# Patient Record
Sex: FEMALE | URBAN - METROPOLITAN AREA
[De-identification: names, ages, dates, MRNs, and addresses within clinical notes are randomized per-mention and may not be internally consistent; named-entity substitution may affect disease eponyms.]

---

## 2021-08-03 ENCOUNTER — APPOINTMENT (RX ONLY)
Dept: URBAN - METROPOLITAN AREA CLINIC 35 | Facility: CLINIC | Age: 81
Setting detail: DERMATOLOGY
End: 2021-08-03

## 2021-08-03 DIAGNOSIS — L81.4 OTHER MELANIN HYPERPIGMENTATION: ICD-10-CM

## 2021-08-03 DIAGNOSIS — L30.9 DERMATITIS, UNSPECIFIED: ICD-10-CM | Status: INADEQUATELY CONTROLLED

## 2021-08-03 DIAGNOSIS — B35.1 TINEA UNGUIUM: ICD-10-CM

## 2021-08-03 DIAGNOSIS — H01.13 ECZEMATOUS DERMATITIS OF EYELID: ICD-10-CM

## 2021-08-03 PROBLEM — H01.134 ECZEMATOUS DERMATITIS OF LEFT UPPER EYELID: Status: ACTIVE | Noted: 2021-08-03

## 2021-08-03 PROBLEM — H01.139 ECZEMATOUS DERMATITIS OF UNSPECIFIED EYE, UNSPECIFIED EYELID: Status: ACTIVE | Noted: 2021-08-03

## 2021-08-03 PROCEDURE — ? DIAGNOSIS COMMENT

## 2021-08-03 PROCEDURE — 99203 OFFICE O/P NEW LOW 30 MIN: CPT

## 2021-08-03 PROCEDURE — ? PRESCRIPTION

## 2021-08-03 PROCEDURE — ? PRESCRIPTION MEDICATION MANAGEMENT

## 2021-08-03 PROCEDURE — ? TREATMENT REGIMEN

## 2021-08-03 PROCEDURE — ? COUNSELING

## 2021-08-03 RX ORDER — TRIAMCINOLONE ACETONIDE 1 MG/G
CREAM TOPICAL BID
Qty: 1 | Refills: 1 | Status: ERX | COMMUNITY
Start: 2021-08-03

## 2021-08-03 RX ADMIN — TRIAMCINOLONE ACETONIDE: 1 CREAM TOPICAL at 00:00

## 2021-08-03 ASSESSMENT — LOCATION SIMPLE DESCRIPTION DERM
LOCATION SIMPLE: LEFT GREAT TOE
LOCATION SIMPLE: RIGHT ELBOW
LOCATION SIMPLE: LEFT SUPERIOR EYELID
LOCATION SIMPLE: RIGHT EYEBROW
LOCATION SIMPLE: RIGHT GREAT TOE
LOCATION SIMPLE: LEFT FOREARM
LOCATION SIMPLE: RIGHT CHEEK

## 2021-08-03 ASSESSMENT — LOCATION DETAILED DESCRIPTION DERM
LOCATION DETAILED: LEFT SUPRATARSAL CREASE
LOCATION DETAILED: RIGHT LATERAL EYEBROW
LOCATION DETAILED: RIGHT ELBOW
LOCATION DETAILED: RIGHT GREAT TOENAIL
LOCATION DETAILED: RIGHT CENTRAL MALAR CHEEK
LOCATION DETAILED: LEFT GREAT TOENAIL
LOCATION DETAILED: LEFT PROXIMAL DORSAL FOREARM

## 2021-08-03 ASSESSMENT — LOCATION ZONE DERM
LOCATION ZONE: TOENAIL
LOCATION ZONE: ARM
LOCATION ZONE: FACE
LOCATION ZONE: EYELID

## 2021-08-03 NOTE — PROCEDURE: PRESCRIPTION MEDICATION MANAGEMENT
Render In Strict Bullet Format?: No
Detail Level: Zone
Initiate Treatment: TAC 0.1% cream BID x2 weeks
Initiate Treatment: Gentle, fragrance free products
Continue Regimen: Tacrolimus BID as needed

## 2021-08-03 NOTE — PROCEDURE: DIAGNOSIS COMMENT
Render Risk Assessment In Note?: no
Comment: Consider bx if not resolved
Detail Level: Simple
Comment: Not a good candidate for systemic therapy due to medication interaction. Patient will consider topical therapy; pt to continue vinegar soaks as she has had success with this and to consider ciclopirox/jublia in future
Comment: chemical peels or laser if pt desires - would refer to Dr. Che

## 2021-09-21 ENCOUNTER — APPOINTMENT (RX ONLY)
Dept: URBAN - METROPOLITAN AREA CLINIC 35 | Facility: CLINIC | Age: 81
Setting detail: DERMATOLOGY
End: 2021-09-21

## 2021-09-21 DIAGNOSIS — L92.0 GRANULOMA ANNULARE: ICD-10-CM

## 2021-09-21 DIAGNOSIS — H01.13 ECZEMATOUS DERMATITIS OF EYELID: ICD-10-CM | Status: WELL CONTROLLED

## 2021-09-21 DIAGNOSIS — L81.0 POSTINFLAMMATORY HYPERPIGMENTATION: ICD-10-CM

## 2021-09-21 DIAGNOSIS — L81.4 OTHER MELANIN HYPERPIGMENTATION: ICD-10-CM

## 2021-09-21 DIAGNOSIS — L72.0 EPIDERMAL CYST: ICD-10-CM

## 2021-09-21 PROBLEM — H01.139 ECZEMATOUS DERMATITIS OF UNSPECIFIED EYE, UNSPECIFIED EYELID: Status: ACTIVE | Noted: 2021-09-21

## 2021-09-21 PROBLEM — H01.134 ECZEMATOUS DERMATITIS OF LEFT UPPER EYELID: Status: ACTIVE | Noted: 2021-09-21

## 2021-09-21 PROCEDURE — ? COUNSELING

## 2021-09-21 PROCEDURE — 99213 OFFICE O/P EST LOW 20 MIN: CPT

## 2021-09-21 PROCEDURE — ? DIAGNOSIS COMMENT

## 2021-09-21 PROCEDURE — ? TREATMENT REGIMEN

## 2021-09-21 ASSESSMENT — LOCATION DETAILED DESCRIPTION DERM
LOCATION DETAILED: RIGHT LATERAL EYEBROW
LOCATION DETAILED: RIGHT INFERIOR CENTRAL MALAR CHEEK
LOCATION DETAILED: LEFT SUPERIOR NASAL CHEEK
LOCATION DETAILED: LEFT DISTAL DORSAL FOREARM
LOCATION DETAILED: RIGHT DISTAL DORSAL FOREARM
LOCATION DETAILED: LEFT SUPRATARSAL CREASE

## 2021-09-21 ASSESSMENT — LOCATION SIMPLE DESCRIPTION DERM
LOCATION SIMPLE: LEFT SUPERIOR EYELID
LOCATION SIMPLE: RIGHT CHEEK
LOCATION SIMPLE: LEFT FOREARM
LOCATION SIMPLE: RIGHT EYEBROW
LOCATION SIMPLE: LEFT CHEEK
LOCATION SIMPLE: RIGHT FOREARM

## 2021-09-21 ASSESSMENT — LOCATION ZONE DERM
LOCATION ZONE: FACE
LOCATION ZONE: EYELID
LOCATION ZONE: ARM

## 2021-09-21 NOTE — PROCEDURE: DIAGNOSIS COMMENT
Detail Level: Simple
Comment: Improving with Neutrogena; consider peels vs. laser for persistence - gave info for Dr. Che
Render Risk Assessment In Note?: no

## 2021-09-21 NOTE — PROCEDURE: TREATMENT REGIMEN
Plan: Discussed bx if worsens or does not resolve with topical treatment; discussed ILK but pt declined as she notes improvement with TAC
Detail Level: Zone
Initiate Treatment: Apply prior TAC rx to affected areas on arms, BID.

## 2022-03-16 ENCOUNTER — PREPPED CHART (OUTPATIENT)
Dept: URBAN - METROPOLITAN AREA CLINIC 80 | Facility: CLINIC | Age: 82
End: 2022-03-16

## 2022-03-16 PROBLEM — H35.373 MACULAR PUCKER: Status: RESOLVED | Noted: 2022-03-16 | Resolved: 2022-03-16

## 2022-03-16 PROBLEM — H35.373 EPIRETINAL MEMBRANE: Noted: 2022-03-16

## 2022-03-16 PROBLEM — H35.373 MACULAR PUCKER: Noted: 2022-03-16

## 2022-03-16 PROBLEM — H35.373 EPIRETINAL MEMBRANE: Noted: 2022-03-16 | Resolved: 2022-03-16

## 2022-03-16 PROBLEM — H26.493 PCO NOT OBSCURING VISION: Noted: 2022-03-16

## 2022-03-16 PROBLEM — H35.342 LAMELLAR MACULAR HOLE: Noted: 2022-03-16

## 2022-07-28 ASSESSMENT — TONOMETRY
OS_IOP_MMHG: 13
OD_IOP_MMHG: 15

## 2022-07-28 ASSESSMENT — VISUAL ACUITY
OD_SC: 20/25-2
OS_SC: 20/20-2

## 2022-11-02 ENCOUNTER — FOLLOW UP (OUTPATIENT)
Dept: URBAN - METROPOLITAN AREA CLINIC 80 | Facility: CLINIC | Age: 82
End: 2022-11-02

## 2022-11-02 DIAGNOSIS — H35.373: ICD-10-CM

## 2022-11-02 PROCEDURE — 92134 CPTRZ OPH DX IMG PST SGM RTA: CPT

## 2022-11-02 PROCEDURE — 92202 OPSCPY EXTND ON/MAC DRAW: CPT

## 2022-11-02 PROCEDURE — 92014 COMPRE OPH EXAM EST PT 1/>: CPT

## 2022-11-02 ASSESSMENT — TONOMETRY
OD_IOP_MMHG: 13
OS_IOP_MMHG: 14

## 2022-11-02 ASSESSMENT — VISUAL ACUITY
OD_SC: 20/25+2
OS_SC: 20/20-1

## 2023-05-03 ENCOUNTER — FOLLOW UP (OUTPATIENT)
Dept: URBAN - METROPOLITAN AREA CLINIC 80 | Facility: CLINIC | Age: 83
End: 2023-05-03

## 2023-05-03 DIAGNOSIS — H35.373: ICD-10-CM

## 2023-05-03 DIAGNOSIS — Z98.890: ICD-10-CM

## 2023-05-03 DIAGNOSIS — H10.13: ICD-10-CM

## 2023-05-03 PROCEDURE — 92134 CPTRZ OPH DX IMG PST SGM RTA: CPT

## 2023-05-03 PROCEDURE — 92202 OPSCPY EXTND ON/MAC DRAW: CPT

## 2023-05-03 PROCEDURE — 92014 COMPRE OPH EXAM EST PT 1/>: CPT

## 2023-05-03 ASSESSMENT — VISUAL ACUITY
OS_SC: 20/30+2
OD_SC: 20/25+1

## 2023-05-03 ASSESSMENT — TONOMETRY
OS_IOP_MMHG: 11
OD_IOP_MMHG: 11

## 2023-11-01 ENCOUNTER — FOLLOW UP (OUTPATIENT)
Dept: URBAN - METROPOLITAN AREA CLINIC 80 | Facility: CLINIC | Age: 83
End: 2023-11-01

## 2023-11-01 DIAGNOSIS — H35.373: ICD-10-CM

## 2023-11-01 PROCEDURE — 92014 COMPRE OPH EXAM EST PT 1/>: CPT

## 2023-11-01 PROCEDURE — 92134 CPTRZ OPH DX IMG PST SGM RTA: CPT

## 2023-11-01 PROCEDURE — 92202 OPSCPY EXTND ON/MAC DRAW: CPT

## 2023-11-01 ASSESSMENT — VISUAL ACUITY
OS_SC: 20/40+1
OD_SC: 20/25-2
OS_PH: 20/25

## 2023-11-01 ASSESSMENT — TONOMETRY
OD_IOP_MMHG: 13
OS_IOP_MMHG: 14

## 2023-11-17 ENCOUNTER — APPOINTMENT (RX ONLY)
Dept: URBAN - METROPOLITAN AREA CLINIC 35 | Facility: CLINIC | Age: 83
Setting detail: DERMATOLOGY
End: 2023-11-17

## 2023-11-17 DIAGNOSIS — L24 IRRITANT CONTACT DERMATITIS: ICD-10-CM

## 2023-11-17 DIAGNOSIS — L84 CORNS AND CALLOSITIES: ICD-10-CM

## 2023-11-17 PROBLEM — L24.9 IRRITANT CONTACT DERMATITIS, UNSPECIFIED CAUSE: Status: ACTIVE | Noted: 2023-11-17

## 2023-11-17 PROCEDURE — ? COUNSELING

## 2023-11-17 PROCEDURE — ? DIAGNOSIS COMMENT

## 2023-11-17 PROCEDURE — 99213 OFFICE O/P EST LOW 20 MIN: CPT

## 2023-11-17 ASSESSMENT — LOCATION DETAILED DESCRIPTION DERM
LOCATION DETAILED: TIP OF LEFT 4TH TOE
LOCATION DETAILED: RIGHT BUTTOCK

## 2023-11-17 ASSESSMENT — LOCATION ZONE DERM
LOCATION ZONE: TRUNK
LOCATION ZONE: TOE

## 2023-11-17 ASSESSMENT — LOCATION SIMPLE DESCRIPTION DERM
LOCATION SIMPLE: RIGHT BUTTOCK
LOCATION SIMPLE: PLANTAR SURFACE OF LEFT 4TH TOE

## 2023-11-17 NOTE — PROCEDURE: DIAGNOSIS COMMENT
Detail Level: Simple
Comment: Recommend OTC [place] Pad and Mediplast\\nDiscussed r/b/se of [place] Pads and Mediplast\\nIf lesion is persistent, recommend seeing a podiatrist for Dr. Rafita Hernandez
Render Risk Assessment In Note?: no
Comment: Pt opts to not treat with topical steroid, reports improvements with Neosporin.\\nRecommend Vaseline daily

## 2023-11-17 NOTE — HPI: SKIN LESION
Is This A New Presentation, Or A Follow-Up?: Skin Lesion
Additional History: Pt reports a painful pore on the right buttock, pt has applied Neosporin and lesion has been getting better in the past few weeks. Pt presents for further evaluation and treatment recommendations.

## 2024-05-22 ENCOUNTER — FOLLOW UP (OUTPATIENT)
Dept: URBAN - METROPOLITAN AREA CLINIC 80 | Facility: CLINIC | Age: 84
End: 2024-05-22

## 2024-05-22 DIAGNOSIS — H35.373: ICD-10-CM

## 2024-05-22 PROCEDURE — 92202 OPSCPY EXTND ON/MAC DRAW: CPT

## 2024-05-22 PROCEDURE — 92134 CPTRZ OPH DX IMG PST SGM RTA: CPT

## 2024-05-22 PROCEDURE — 92014 COMPRE OPH EXAM EST PT 1/>: CPT

## 2024-05-22 ASSESSMENT — VISUAL ACUITY
OD_PH: 20/25-2
OS_SC: 20/40-1
OS_PH: 20/30+2
OD_SC: 20/25-3

## 2024-05-22 ASSESSMENT — TONOMETRY
OD_IOP_MMHG: 13
OS_IOP_MMHG: 16

## 2024-12-11 ENCOUNTER — FOLLOW UP (OUTPATIENT)
Dept: URBAN - METROPOLITAN AREA CLINIC 80 | Facility: CLINIC | Age: 84
End: 2024-12-11

## 2024-12-11 DIAGNOSIS — H35.373: ICD-10-CM

## 2024-12-11 PROCEDURE — 92014 COMPRE OPH EXAM EST PT 1/>: CPT

## 2024-12-11 PROCEDURE — 92134 CPTRZ OPH DX IMG PST SGM RTA: CPT

## 2024-12-11 PROCEDURE — 92202 OPSCPY EXTND ON/MAC DRAW: CPT

## 2024-12-11 ASSESSMENT — TONOMETRY
OS_IOP_MMHG: 18
OD_IOP_MMHG: 15

## 2024-12-11 ASSESSMENT — VISUAL ACUITY
OS_SC: 20/25-1
OD_SC: 20/20-1

## 2025-07-09 ENCOUNTER — FOLLOW UP (OUTPATIENT)
Dept: URBAN - METROPOLITAN AREA CLINIC 80 | Facility: CLINIC | Age: 85
End: 2025-07-09

## 2025-07-09 DIAGNOSIS — H35.373: ICD-10-CM

## 2025-07-09 PROCEDURE — 92202 OPSCPY EXTND ON/MAC DRAW: CPT

## 2025-07-09 PROCEDURE — 92014 COMPRE OPH EXAM EST PT 1/>: CPT

## 2025-07-09 PROCEDURE — 92134 CPTRZ OPH DX IMG PST SGM RTA: CPT

## 2025-07-09 ASSESSMENT — VISUAL ACUITY
OD_SC: 20/20
OS_SC: 20/25-2

## 2025-07-09 ASSESSMENT — TONOMETRY
OD_IOP_MMHG: 7
OS_IOP_MMHG: 8